# Patient Record
Sex: FEMALE | Race: WHITE | NOT HISPANIC OR LATINO | Employment: UNEMPLOYED | URBAN - METROPOLITAN AREA
[De-identification: names, ages, dates, MRNs, and addresses within clinical notes are randomized per-mention and may not be internally consistent; named-entity substitution may affect disease eponyms.]

---

## 2024-06-11 ENCOUNTER — HOSPITAL ENCOUNTER (EMERGENCY)
Facility: HOSPITAL | Age: 34
Discharge: HOME | End: 2024-06-11
Attending: EMERGENCY MEDICINE | Admitting: EMERGENCY MEDICINE
Payer: MEDICAID

## 2024-06-11 VITALS
HEART RATE: 100 BPM | DIASTOLIC BLOOD PRESSURE: 73 MMHG | RESPIRATION RATE: 16 BRPM | BODY MASS INDEX: 30.58 KG/M2 | HEIGHT: 59 IN | WEIGHT: 151.68 LBS | TEMPERATURE: 97.8 F | SYSTOLIC BLOOD PRESSURE: 129 MMHG | OXYGEN SATURATION: 100 %

## 2024-06-11 DIAGNOSIS — J18.9 COMMUNITY ACQUIRED PNEUMONIA OF LEFT LOWER LOBE OF LUNG: Primary | ICD-10-CM

## 2024-06-11 PROCEDURE — 99282 EMERGENCY DEPT VISIT SF MDM: CPT

## 2024-06-11 NOTE — ED PROVIDER NOTES
Time: 1:55 PM EDT  Date of encounter:  2024  Independent Historian/Clinical History and Information was obtained by:   Patient    History is limited by: N/A    Chief Complaint: SOA, cough, headache      History of Present Illness:  Patient is a 33 y.o. year old female who presents to the emergency department for evaluation of sinus congestion, drainage, cough congestion, shortness of breath, headache, body aches since yesterday.  States she is about to start her period and usually feels better prior to starting her period but the symptoms seem to little worse with the addition of cough and shortness of breath.  She presented to the urgent care center today and was diagnosed with pneumonia and given prescriptions for antibiotics but she states she does not trust them and was, and here for second opinion.  States she really does not like to take medication as they gave her severe anxiety and she wanted to make sure that they were really indicated.      Patient Care Team  Primary Care Provider: Provider, No Known    Past Medical History:     Allergies   Allergen Reactions    Morphine Itching     Past Medical History:   Diagnosis Date    Abnormal Pap smear of cervix     Anemia     Anxiety     Depression     HPV (human papilloma virus) infection     Migraine     Ovarian cyst     Trauma     Urogenital trichomoniasis      Past Surgical History:   Procedure Laterality Date    BREAST SURGERY      CERVICAL BIOPSY  W/ LOOP ELECTRODE EXCISION       SECTION      CHOLECYSTECTOMY      LAPAROSCOPIC CHOLECYSTECTOMY      TUBAL ABDOMINAL LIGATION       Family History   Problem Relation Age of Onset    Diabetes Mother     Pulmonary embolism Maternal Grandfather     Breast cancer Neg Hx     Ovarian cancer Neg Hx     Uterine cancer Neg Hx     Colon cancer Neg Hx     Melanoma Neg Hx     Prostate cancer Neg Hx     Deep vein thrombosis Neg Hx        Home Medications:  Prior to Admission medications    Medication Sig Start Date  "End Date Taking? Authorizing Provider   albuterol sulfate  (90 Base) MCG/ACT inhaler Inhale 2 puffs Every 6 (Six) Hours As Needed for Shortness of Air. 6/11/24   Ace Suarez MD   azithromycin (Zithromax Z-Gilberto) 250 MG tablet Take 2 tablets by mouth on day 1, then 1 tablet daily on days 2-5 6/11/24   Ace Suarez MD   cefdinir (OMNICEF) 300 MG capsule Take 1 capsule by mouth 2 (Two) Times a Day for 10 days. 6/11/24 6/21/24  Ace Suarez MD        Social History:   Social History     Tobacco Use    Smoking status: Every Day     Average packs/day: 1 pack/day for 7.0 years (7.0 ttl pk-yrs)     Types: Cigarettes     Start date: 2004    Smokeless tobacco: Never   Vaping Use    Vaping status: Never Used   Substance Use Topics    Alcohol use: Not Currently    Drug use: Never         Review of Systems:  Review of Systems   HENT:  Positive for congestion and sinus pressure.    Respiratory:  Positive for cough and shortness of breath.    Musculoskeletal:  Positive for myalgias.   Neurological:  Positive for headaches.        Physical Exam:  /73 (BP Location: Left arm, Patient Position: Sitting)   Pulse 100   Temp 97.8 °F (36.6 °C) (Oral)   Resp 16   Ht 149.9 cm (59\")   Wt 68.8 kg (151 lb 10.8 oz)   LMP 05/16/2024 (Exact Date)   SpO2 100%   BMI 30.63 kg/m²     Physical Exam  Vitals and nursing note reviewed.   Constitutional:       General: She is not in acute distress.     Appearance: Normal appearance. She is not toxic-appearing.   HENT:      Head: Normocephalic and atraumatic.      Nose: Nose normal.      Mouth/Throat:      Mouth: Mucous membranes are moist.   Eyes:      Conjunctiva/sclera: Conjunctivae normal.   Cardiovascular:      Rate and Rhythm: Normal rate and regular rhythm.      Pulses: Normal pulses.      Heart sounds: Normal heart sounds.   Pulmonary:      Effort: Pulmonary effort is normal.      Breath sounds: Normal breath sounds.   Abdominal:      General: " Bowel sounds are normal.      Palpations: Abdomen is soft.      Tenderness: There is no abdominal tenderness.   Musculoskeletal:         General: Normal range of motion.      Cervical back: Normal range of motion.   Skin:     General: Skin is warm and dry.   Neurological:      General: No focal deficit present.      Mental Status: She is alert and oriented to person, place, and time.   Psychiatric:         Mood and Affect: Mood normal.         Behavior: Behavior normal.         Thought Content: Thought content normal.         Judgment: Judgment normal.                      Medical Decision Making:      Comorbidities that affect care:    Smoking    External Notes reviewed:    Previous Clinic Note: Reviewed urgent care center note from today.  Patient had negative flu COVID RSV and strep.  Chest x-ray showed atelectasis versus pneumonia left lung base.  Patient was prescribed Omnicef, Zithromax and albuterol inhaler.      ED Course:  Discussed with patient findings from the urgent care center.  Patient wants to know if it is viral versus bacterial.  Discussed with patient no way to really tell and if she feels strongly about not taking medication she can wait a few days and see if she feels better.  Patient agreed with this and is ready for discharge.       Labs:    Lab Results (last 24 hours)       Procedure Component Value Units Date/Time    POC Rapid Strep A [[id number]]  (Normal) Resulted: 06/11/24 0855    Specimen: Swab Updated: 06/11/24 0855     Rapid Strep A Screen Negative     Internal Control Passed     Lot Number #[id number]     Expiration Date 7/22/25    POC Influenza A / B [[id number]]  (Normal) Collected: 06/11/24 0859    Specimen: Swab Updated: 06/11/24 0859     Rapid Influenza A Ag Negative     Rapid Influenza B Ag Negative     Internal Control Passed     Lot Number [id number]     Expiration Date 12/12/25    POCT SARS-CoV-2 Antigen [[id number]]  (Normal) Collected: 06/11/24 0903    Specimen: Swab from  Nasopharynx Updated: 06/11/24 0904     SARS Antigen Not Detected     Internal Control Passed     Lot Number [id number]     Expiration Date 7/11/24             Imaging:    XR Chest 2 View    Result Date: 6/11/2024  XR CHEST 2 VW Date of Exam: 6/11/2024 9:13 AM EDT Indication: Pain in the upper chest area with inspiration and short of breath Comparison: None available. Findings: Heart size is within normal limits. There is increased infrahilar opacity in the retrocardiac aspect of the left base. Lungs are otherwise clear. Osseous structures demonstrate no acute abnormality     Impression: Increased infrahilar opacity at the left lung base which could represent atelectasis or pneumonia. Please correlate with clinical exam Electronically Signed: Morgan Gibbs MD  6/11/2024 9:22 AM EDT  Workstation ID: [id number]       Differential Diagnosis and Discussion:    Cough: Differential diagnosis includes but is not limited to pneumonia, acute bronchitis, upper respiratory infection, ACE inhibitor use, allergic reaction, epiglottitis, seasonal allergies, chemical irritants, exercise-induced asthma, viral syndrome.    All labs were reviewed and interpreted by me.  All X-rays impressions were independently interpreted by me.    [facility]             Patient Care Considerations:    CHEST X-RAY: I considered ordering a chest x-ray however patient had 1 already this morning in urgent care center that was available to us.      Consultants/Shared Management Plan:    None    Social Determinants of Health:    Patient is independent, reliable, and has access to care.       Disposition and Care Coordination:    Discharged: The patient is suitable and stable for discharge with no need for consideration of admission.    I have explained discharge medications and the need for follow up with the patient/caretakers. This was also printed in the discharge instructions. Patient was discharged with the following medications and follow up:       Medication List      No changes were made to your prescriptions during this visit.      No follow-up provider specified.     Final diagnoses:   Community acquired pneumonia of left lower lobe of lung        ED Disposition       ED Disposition   Discharge    Condition   Stable    Comment   --               This medical record created using voice recognition software.             Yuid Crystal, APRN  06/11/24 9714

## 2024-06-11 NOTE — DISCHARGE INSTRUCTIONS
You may wait a few days and see if symptoms resolve on their own otherwise take medications as prescribed by the urgent care center.  Return to ED for any new or worsening symptoms.

## 2024-06-25 ENCOUNTER — APPOINTMENT (OUTPATIENT)
Dept: GENERAL RADIOLOGY | Facility: HOSPITAL | Age: 34
End: 2024-06-25
Payer: MEDICAID

## 2024-06-25 ENCOUNTER — HOSPITAL ENCOUNTER (EMERGENCY)
Facility: HOSPITAL | Age: 34
Discharge: HOME | End: 2024-06-25
Attending: EMERGENCY MEDICINE | Admitting: EMERGENCY MEDICINE
Payer: MEDICAID

## 2024-06-25 VITALS
DIASTOLIC BLOOD PRESSURE: 86 MMHG | RESPIRATION RATE: 18 BRPM | OXYGEN SATURATION: 100 % | SYSTOLIC BLOOD PRESSURE: 134 MMHG | WEIGHT: 155.42 LBS | BODY MASS INDEX: 31.33 KG/M2 | TEMPERATURE: 97.6 F | HEART RATE: 84 BPM | HEIGHT: 59 IN

## 2024-06-25 DIAGNOSIS — Z01.89 ENCOUNTER FOR ROUTINE CHEST X-RAY: Primary | ICD-10-CM

## 2024-06-25 PROCEDURE — 71045 X-RAY EXAM CHEST 1 VIEW: CPT

## 2024-06-25 PROCEDURE — 99283 EMERGENCY DEPT VISIT LOW MDM: CPT

## 2024-06-25 NOTE — ED PROVIDER NOTES
Time: 7:32 AM EDT  Date of encounter:  2024  Independent Historian/Clinical History and Information was obtained by:   Patient    History is limited by: N/A    Chief Complaint: Requesting chest x-ray      History of Present Illness:  Patient is a 34 y.o. year old female who presents to the emergency department for evaluation of requesting chest x-ray.  Patient states she was seen in urgent care 2 weeks ago and diagnosed with pneumonia but never took antibiotics.  Patient denies cough, chest pain, shortness of breath, and fever.  Patient states she is completely asymptomatic but just wants a chest x-ray to make sure she is okay    HPI    Patient Care Team  Primary Care Provider: Provider, No Known    Past Medical History:     Allergies   Allergen Reactions    Morphine Itching     Past Medical History:   Diagnosis Date    Abnormal Pap smear of cervix     Anemia     Anxiety     Depression     HPV (human papilloma virus) infection     Migraine     Ovarian cyst     Trauma     Urogenital trichomoniasis      Past Surgical History:   Procedure Laterality Date    BREAST SURGERY      CERVICAL BIOPSY  W/ LOOP ELECTRODE EXCISION       SECTION      CHOLECYSTECTOMY      LAPAROSCOPIC CHOLECYSTECTOMY      TUBAL ABDOMINAL LIGATION       Family History   Problem Relation Age of Onset    Diabetes Mother     Pulmonary embolism Maternal Grandfather     Breast cancer Neg Hx     Ovarian cancer Neg Hx     Uterine cancer Neg Hx     Colon cancer Neg Hx     Melanoma Neg Hx     Prostate cancer Neg Hx     Deep vein thrombosis Neg Hx        Home Medications:  Prior to Admission medications    Medication Sig Start Date End Date Taking? Authorizing Provider   albuterol sulfate  (90 Base) MCG/ACT inhaler Inhale 2 puffs Every 6 (Six) Hours As Needed for Shortness of Air. 24   Ace Suarez MD   azithromycin (Zithromax Z-Gilberto) 250 MG tablet Take 2 tablets by mouth on day 1, then 1 tablet daily on days 2-5 24    "Ace Suarez MD        Social History:   Social History     Tobacco Use    Smoking status: Every Day     Average packs/day: 1 pack/day for 7.0 years (7.0 ttl pk-yrs)     Types: Cigarettes     Start date: 2004    Smokeless tobacco: Never   Vaping Use    Vaping status: Never Used   Substance Use Topics    Alcohol use: Not Currently    Drug use: Never         Review of Systems:  Review of Systems   Constitutional:  Negative for chills and fever.   HENT:  Negative for congestion, rhinorrhea and sore throat.    Eyes:  Negative for pain and visual disturbance.   Respiratory:  Negative for apnea, cough, chest tightness and shortness of breath.    Cardiovascular:  Negative for chest pain and palpitations.   Gastrointestinal:  Negative for abdominal pain, diarrhea, nausea and vomiting.   Genitourinary:  Negative for difficulty urinating and dysuria.   Musculoskeletal:  Negative for joint swelling and myalgias.   Skin:  Negative for color change.   Neurological:  Negative for seizures and headaches.   Psychiatric/Behavioral: Negative.     All other systems reviewed and are negative.       Physical Exam:  /86 (BP Location: Left arm, Patient Position: Sitting)   Pulse 86   Temp 97.6 °F (36.4 °C) (Oral)   Resp 18   Ht 149.9 cm (59\")   Wt 70.5 kg (155 lb 6.8 oz)   LMP 06/13/2024 (Exact Date)   SpO2 100%   BMI 31.39 kg/m²     Physical Exam  Vitals and nursing note reviewed.   Constitutional:       General: She is not in acute distress.     Appearance: Normal appearance. She is not toxic-appearing.   HENT:      Head: Normocephalic and atraumatic.      Jaw: There is normal jaw occlusion.   Eyes:      General: Lids are normal.      Extraocular Movements: Extraocular movements intact.      Conjunctiva/sclera: Conjunctivae normal.      Pupils: Pupils are equal, round, and reactive to light.   Cardiovascular:      Rate and Rhythm: Normal rate and regular rhythm.      Pulses: Normal pulses.      Heart sounds: " Normal heart sounds.   Pulmonary:      Effort: Pulmonary effort is normal. No respiratory distress.      Breath sounds: Normal breath sounds. No wheezing or rhonchi.   Abdominal:      General: Abdomen is flat.      Palpations: Abdomen is soft.      Tenderness: There is no abdominal tenderness. There is no guarding or rebound.   Musculoskeletal:         General: Normal range of motion.      Cervical back: Normal range of motion and neck supple.      Right lower leg: No edema.      Left lower leg: No edema.   Skin:     General: Skin is warm and dry.   Neurological:      Mental Status: She is alert and oriented to person, place, and time. Mental status is at baseline.   Psychiatric:         Mood and Affect: Mood normal.                  Procedures:  Procedures      Medical Decision Making:      Comorbidities that affect care:    None    External Notes reviewed:          The following orders were placed and all results were independently analyzed by me:  Orders Placed This Encounter   Procedures    XR Chest 1 View       Medications Given in the Emergency Department:  Medications - No data to display     ED Course:         Labs:    Lab Results (last 24 hours)       ** No results found for the last 24 hours. **             Imaging:    XR Chest 1 View    Result Date: 6/25/2024  XR CHEST 1 VW Date of Exam: 6/25/2024 7:35 AM EDT Indication: pneumonia Comparison: 6/11/2024. Findings: Heart and pulmonary vessels and mediastinal contours appear within normal limits. Lung fields appear clear of acute infiltrates or effusions. Previously identified left lower lobe infiltrate has cleared. There is no pneumothorax.     Impression: Resolution of infiltrate. No acute process. Electronically Signed: Martine Saucedo MD  6/25/2024 7:57 AM EDT  Workstation ID: [id number]       Differential Diagnosis and Discussion:    Cough: Differential diagnosis includes but is not limited to pneumonia, acute bronchitis, upper respiratory infection, ACE  inhibitor use, allergic reaction, epiglottitis, seasonal allergies, chemical irritants, exercise-induced asthma, viral syndrome.    All X-rays impressions were independently interpreted by me.    MDM     Chest x-ray shows no evidence of pneumonia.  Patient's lungs are clear to auscultation bilaterally.  Patient's oxygen saturation is 100% on room air.  Instructed patient to follow-up PCP.  Instructed patient to return to ED if she develops any new or worsening symptoms.  Patient states she understands and agrees with plan of care.          Patient Care Considerations:          Consultants/Shared Management Plan:    None    Social Determinants of Health:    Patient is independent, reliable, and has access to care.       Disposition and Care Coordination:    Discharged: The patient is suitable and stable for discharge with no need for consideration of admission.    I have explained the patient´s condition, diagnoses and treatment plan based on the information available to me at this time. I have answered questions and addressed any concerns. The patient has a good  understanding of the patient´s diagnosis, condition, and treatment plan as can be expected at this point. The vital signs have been stable. The patient´s condition is stable and appropriate for discharge from the emergency department.      The patient will pursue further outpatient evaluation with the primary care physician or other designated or consulting physician as outlined in the discharge instructions. They are agreeable to this plan of care and follow-up instructions have been explained in detail. The patient has received these instructions in written format and has expressed an understanding of the discharge instructions. The patient is aware that any significant change in condition or worsening of symptoms should prompt an immediate return to this or the closest emergency department or call to 911.  I have explained discharge medications and the  need for follow up with the patient/caretakers. This was also printed in the discharge instructions. Patient was discharged with the following medications and follow up:      Medication List      No changes were made to your prescriptions during this visit.      Provider, No Known  [facility] [place] [id number]    Call in 1 day  To schedule follow-up       Final diagnoses:   Encounter for routine chest x-ray        ED Disposition       ED Disposition   Discharge    Condition   Stable    Comment   --               This medical record created using voice recognition software.             Dallin Farmer PA-C  06/25/24 0827

## 2024-07-02 ENCOUNTER — OFFICE VISIT (OUTPATIENT)
Dept: OBSTETRICS AND GYNECOLOGY | Facility: CLINIC | Age: 34
End: 2024-07-02
Payer: MEDICAID

## 2024-07-02 VITALS
WEIGHT: 154 LBS | SYSTOLIC BLOOD PRESSURE: 116 MMHG | DIASTOLIC BLOOD PRESSURE: 84 MMHG | HEART RATE: 114 BPM | HEIGHT: 59 IN | BODY MASS INDEX: 31.04 KG/M2

## 2024-07-02 DIAGNOSIS — Z01.419 ENCOUNTER FOR GYNECOLOGICAL EXAMINATION WITHOUT ABNORMAL FINDING: Primary | ICD-10-CM

## 2024-07-02 PROCEDURE — 87624 HPV HI-RISK TYP POOLED RSLT: CPT | Performed by: OBSTETRICS & GYNECOLOGY

## 2024-07-02 PROCEDURE — 87661 TRICHOMONAS VAGINALIS AMPLIF: CPT | Performed by: OBSTETRICS & GYNECOLOGY

## 2024-07-02 PROCEDURE — 87591 N.GONORRHOEAE DNA AMP PROB: CPT | Performed by: OBSTETRICS & GYNECOLOGY

## 2024-07-02 PROCEDURE — G0123 SCREEN CERV/VAG THIN LAYER: HCPCS | Performed by: OBSTETRICS & GYNECOLOGY

## 2024-07-02 PROCEDURE — 87491 CHLMYD TRACH DNA AMP PROBE: CPT | Performed by: OBSTETRICS & GYNECOLOGY

## 2024-07-02 NOTE — PROGRESS NOTES
"Well Woman Visit    CC: Annual well woman exam       HPI:   34 y.o. Contraception or HRT: Contraception:  Tubal ligation  Menses:   q mon, lasts 6 days, changes products q 1hrs on heaviest days.   Pain:  Moderate, not too bad  Incontinence concerns: No  Hx of abnormal pap:  Yes  Pt has no complaints today. Desires std swab. Denies sx.       History: PMHx, Meds, Allergies, PSHx, Social Hx, and POBHx all reviewed and updated.      PHYSICAL EXAM:  /84   Pulse 114   Ht 149.9 cm (59\")   Wt 69.9 kg (154 lb)   LMP 2024 (Exact Date)   BMI 31.10 kg/m²   General- NAD, alert and oriented, appropriate  Psych- Normal mood, good memory  Neck- No masses, no thyroid enlargement  CV- Regular rhythm, no murnurs  Resp- CTA to bases, no wheezes  Abdomen- Soft, non distended, non tender, no masses    Breast left-  Bilaterally symmetrical, no masses, non tender, no nipple discharge  Breast right- Bilaterally symmetrical, no masses, non tender, no nipple discharge    External genitalia- Normal female, no lesions  Urethra/meatus- Normal, no masses, non tender, no prolapse  Bladder- Normal, no masses, non tender, no prolapse  Vagina- Normal, no atrophy, no lesions, no discharge, no prolapse  Cvx- Normal, no lesions, no discharge, No cervical motion tenderness  Uterus- Normal size, shape & consistency.  Non tender, mobile.  Adnexa- No mass, non tender  Anus/Rectum/Perineum- Not performed    Lymphatic- No palpable neck, axillary, or groin nodes  Ext- No edema, no cyanosis    Skin- No lesions, no rashes, no acanthosis nigricans        ASSESSMENT and PLAN:  WWE    Diagnoses and all orders for this visit:    1. Encounter for gynecological examination without abnormal finding (Primary)  -     IGP,CtNgTv,Apt HPV    Discussed pap guidelines. Pt desires to continue with yearly paps for now.     Counseling:     Track menses, RTO IF <q21d, >7d long, or heavy    Domestic violence/abuse screen: negative    Depression screen: no " SI    Preventative:   BREAST HEALTH- Monthly self breast exam importance and how to reviewed. MMG and/or MRI (prn) reviewed per society guidelines and her individual history. Mammo/MRI screen: Not medically needed.  CERVICAL CANCER Screening- Reviewed current ASCCP guidelines for screening w and wo cotest HPV, age specific.  Screen: Updated today.  COLON CANCER Screening- Reviewed current medical society guidelines and options.  Colonoscopy screen:  Not medically needed.  SEXUAL HEALTH: STD screening desired.  Ordered.  VACCINATIONS Recommended: Flu annually, Gardisil/HPV vaccine 9-27yo, up to 44yo.  Importance discussed, risk being unvaccinated reviewed.  Questions answered  SMOKING STATUS- pt does use nicotine and/or tobacco.  I reviewed importance of avoiding.  Options to quit offered per [employer] protocols.  Recommend FU w PCP regarding quitting options if unable to quit wo assistance.  [facility] : does not qualify.  Gardasil status: declined.      She understands the importance of having any ordered tests to be performed in a timely fashion.  She is encouraged to review her results online and/or contact or office if she has questions.     Follow Up:  Return in about 1 year (around 7/2/2025) for Annual physical.      Trang Coronado, FRANCESCA  07/02/2024

## 2024-07-05 LAB
C TRACH RRNA CVX QL NAA+PROBE: NEGATIVE
CYTOLOGIST CVX/VAG CYTO: NORMAL
CYTOLOGY CVX/VAG DOC CYTO: NORMAL
CYTOLOGY CVX/VAG DOC THIN PREP: NORMAL
DX ICD CODE: NORMAL
HPV I/H RISK 4 DNA CVX QL PROBE+SIG AMP: NEGATIVE
Lab: NORMAL
N GONORRHOEA RRNA CVX QL NAA+PROBE: NEGATIVE
OTHER STN SPEC: NORMAL
STAT OF ADQ CVX/VAG CYTO-IMP: NORMAL
T VAGINALIS RRNA SPEC QL NAA+PROBE: NEGATIVE

## 2025-08-28 ENCOUNTER — TELEPHONE (OUTPATIENT)
Dept: OBSTETRICS AND GYNECOLOGY | Age: 35
End: 2025-08-28